# Patient Record
Sex: FEMALE | Race: WHITE | URBAN - NONMETROPOLITAN AREA
[De-identification: names, ages, dates, MRNs, and addresses within clinical notes are randomized per-mention and may not be internally consistent; named-entity substitution may affect disease eponyms.]

---

## 2022-03-22 ENCOUNTER — OFFICE VISIT (OUTPATIENT)
Dept: FAMILY MEDICINE CLINIC | Facility: CLINIC | Age: 52
End: 2022-03-22

## 2022-03-22 ENCOUNTER — TELEPHONE (OUTPATIENT)
Dept: PHYSICAL THERAPY | Facility: HOSPITAL | Age: 52
End: 2022-03-22

## 2022-03-22 VITALS
RESPIRATION RATE: 16 BRPM | DIASTOLIC BLOOD PRESSURE: 80 MMHG | SYSTOLIC BLOOD PRESSURE: 120 MMHG | TEMPERATURE: 97 F | BODY MASS INDEX: 33.62 KG/M2 | WEIGHT: 169 LBS | HEART RATE: 71 BPM | OXYGEN SATURATION: 98 % | HEIGHT: 59.5 IN

## 2022-03-22 DIAGNOSIS — M75.21 BICEPS TENDONITIS ON RIGHT: Primary | ICD-10-CM

## 2022-03-22 DIAGNOSIS — I48.11 LONGSTANDING PERSISTENT ATRIAL FIBRILLATION (HCC): ICD-10-CM

## 2022-03-22 DIAGNOSIS — Z79.01 LONG TERM (CURRENT) USE OF ANTICOAGULANTS: ICD-10-CM

## 2022-03-22 PROBLEM — I63.9 CVA (CEREBRAL VASCULAR ACCIDENT) (HCC): Status: ACTIVE | Noted: 2019-01-01

## 2022-03-22 PROCEDURE — 99203 OFFICE O/P NEW LOW 30 MIN: CPT | Performed by: FAMILY MEDICINE

## 2022-03-22 PROCEDURE — 3008F BODY MASS INDEX DOCD: CPT | Performed by: FAMILY MEDICINE

## 2022-03-22 PROCEDURE — 3079F DIAST BP 80-89 MM HG: CPT | Performed by: FAMILY MEDICINE

## 2022-03-22 PROCEDURE — 3074F SYST BP LT 130 MM HG: CPT | Performed by: FAMILY MEDICINE

## 2022-03-22 RX ORDER — APIXABAN 5 MG/1
5 TABLET, FILM COATED ORAL 2 TIMES DAILY
COMMUNITY
Start: 2022-02-23

## 2022-03-22 RX ORDER — FAMOTIDINE 40 MG/1
40 TABLET, FILM COATED ORAL 2 TIMES DAILY
COMMUNITY
Start: 2021-12-31

## 2022-03-22 NOTE — PATIENT INSTRUCTIONS
I discussed diagnosis and treatment plan  Would recommend physical therapy  Activity as tolerated  Patient may continue acetaminophen up to 1000 mg 3 times daily  She may benefit from the use of topical diclofenac 1% gel 4 times daily, may continue Biofreeze as needed  Follow-up after 2 weeks of therapy

## 2022-03-25 ENCOUNTER — TELEPHONE (OUTPATIENT)
Dept: PHYSICAL THERAPY | Facility: HOSPITAL | Age: 52
End: 2022-03-25

## 2022-03-28 ENCOUNTER — OFFICE VISIT (OUTPATIENT)
Dept: PHYSICAL THERAPY | Age: 52
End: 2022-03-28
Attending: FAMILY MEDICINE
Payer: OTHER MISCELLANEOUS

## 2022-03-28 DIAGNOSIS — M75.21 BICEPS TENDONITIS ON RIGHT: ICD-10-CM

## 2022-03-28 PROCEDURE — 97110 THERAPEUTIC EXERCISES: CPT

## 2022-03-28 PROCEDURE — 97161 PT EVAL LOW COMPLEX 20 MIN: CPT

## 2022-03-28 PROCEDURE — 97140 MANUAL THERAPY 1/> REGIONS: CPT

## 2022-03-31 ENCOUNTER — OFFICE VISIT (OUTPATIENT)
Dept: PHYSICAL THERAPY | Age: 52
End: 2022-03-31
Attending: FAMILY MEDICINE
Payer: OTHER MISCELLANEOUS

## 2022-03-31 DIAGNOSIS — M75.21 BICEPS TENDONITIS ON RIGHT: ICD-10-CM

## 2022-03-31 PROCEDURE — 97140 MANUAL THERAPY 1/> REGIONS: CPT

## 2022-03-31 PROCEDURE — 97110 THERAPEUTIC EXERCISES: CPT

## 2022-04-08 ENCOUNTER — OFFICE VISIT (OUTPATIENT)
Dept: PHYSICAL THERAPY | Age: 52
End: 2022-04-08
Attending: FAMILY MEDICINE
Payer: OTHER MISCELLANEOUS

## 2022-04-08 DIAGNOSIS — M75.21 BICEPS TENDONITIS ON RIGHT: ICD-10-CM

## 2022-04-08 PROCEDURE — 97140 MANUAL THERAPY 1/> REGIONS: CPT

## 2022-04-08 PROCEDURE — 97110 THERAPEUTIC EXERCISES: CPT

## 2022-04-11 ENCOUNTER — OFFICE VISIT (OUTPATIENT)
Dept: PHYSICAL THERAPY | Age: 52
End: 2022-04-11
Attending: FAMILY MEDICINE
Payer: OTHER MISCELLANEOUS

## 2022-04-11 DIAGNOSIS — M75.21 BICEPS TENDONITIS ON RIGHT: ICD-10-CM

## 2022-04-11 PROCEDURE — 97140 MANUAL THERAPY 1/> REGIONS: CPT

## 2022-04-11 PROCEDURE — 97110 THERAPEUTIC EXERCISES: CPT

## 2022-04-14 ENCOUNTER — APPOINTMENT (OUTPATIENT)
Dept: PHYSICAL THERAPY | Age: 52
End: 2022-04-14
Attending: FAMILY MEDICINE
Payer: OTHER MISCELLANEOUS

## 2022-04-26 ENCOUNTER — OFFICE VISIT (OUTPATIENT)
Dept: PHYSICAL THERAPY | Age: 52
End: 2022-04-26
Attending: FAMILY MEDICINE
Payer: OTHER MISCELLANEOUS

## 2022-04-26 DIAGNOSIS — M75.21 BICEPS TENDONITIS ON RIGHT: ICD-10-CM

## 2022-04-26 PROCEDURE — 97140 MANUAL THERAPY 1/> REGIONS: CPT

## 2022-04-26 PROCEDURE — 97110 THERAPEUTIC EXERCISES: CPT

## 2022-04-29 ENCOUNTER — OFFICE VISIT (OUTPATIENT)
Dept: PHYSICAL THERAPY | Age: 52
End: 2022-04-29
Attending: FAMILY MEDICINE
Payer: OTHER MISCELLANEOUS

## 2022-04-29 DIAGNOSIS — M75.21 BICEPS TENDONITIS ON RIGHT: ICD-10-CM

## 2022-04-29 PROCEDURE — 97140 MANUAL THERAPY 1/> REGIONS: CPT

## 2022-04-29 PROCEDURE — 97110 THERAPEUTIC EXERCISES: CPT

## 2022-05-03 ENCOUNTER — OFFICE VISIT (OUTPATIENT)
Dept: PHYSICAL THERAPY | Age: 52
End: 2022-05-03
Attending: FAMILY MEDICINE
Payer: OTHER MISCELLANEOUS

## 2022-05-03 DIAGNOSIS — M75.21 BICEPS TENDONITIS ON RIGHT: ICD-10-CM

## 2022-05-03 PROCEDURE — 97110 THERAPEUTIC EXERCISES: CPT

## 2022-05-03 PROCEDURE — 97140 MANUAL THERAPY 1/> REGIONS: CPT

## 2022-05-10 ENCOUNTER — APPOINTMENT (OUTPATIENT)
Dept: PHYSICAL THERAPY | Age: 52
End: 2022-05-10
Attending: FAMILY MEDICINE
Payer: OTHER MISCELLANEOUS

## 2022-05-10 ENCOUNTER — TELEPHONE (OUTPATIENT)
Dept: PHYSICAL THERAPY | Facility: HOSPITAL | Age: 52
End: 2022-05-10

## 2022-05-12 ENCOUNTER — OFFICE VISIT (OUTPATIENT)
Dept: FAMILY MEDICINE CLINIC | Facility: CLINIC | Age: 52
End: 2022-05-12
Payer: OTHER MISCELLANEOUS

## 2022-05-12 VITALS
BODY MASS INDEX: 34.32 KG/M2 | HEIGHT: 59 IN | HEART RATE: 70 BPM | SYSTOLIC BLOOD PRESSURE: 126 MMHG | TEMPERATURE: 97 F | DIASTOLIC BLOOD PRESSURE: 78 MMHG | OXYGEN SATURATION: 97 % | RESPIRATION RATE: 20 BRPM | WEIGHT: 170.25 LBS

## 2022-05-12 DIAGNOSIS — M65.841 STENOSING TENOSYNOVITIS OF FINGER OF RIGHT HAND: Primary | ICD-10-CM

## 2022-05-12 DIAGNOSIS — M75.21 BICEPS TENDONITIS ON RIGHT: ICD-10-CM

## 2022-05-12 PROCEDURE — 99213 OFFICE O/P EST LOW 20 MIN: CPT | Performed by: FAMILY MEDICINE

## 2022-05-12 PROCEDURE — 3074F SYST BP LT 130 MM HG: CPT | Performed by: FAMILY MEDICINE

## 2022-05-12 PROCEDURE — 3078F DIAST BP <80 MM HG: CPT | Performed by: FAMILY MEDICINE

## 2022-05-12 PROCEDURE — 3008F BODY MASS INDEX DOCD: CPT | Performed by: FAMILY MEDICINE

## 2022-05-12 RX ORDER — CYANOCOBALAMIN (VITAMIN B-12) 1000 MCG
1 TABLET, EXTENDED RELEASE ORAL DAILY
COMMUNITY

## 2022-05-12 RX ORDER — MOMETASONE FUROATE 50 UG/1
SPRAY, METERED NASAL
COMMUNITY

## 2022-05-12 NOTE — PATIENT INSTRUCTIONS
I discussed likely diagnosis for right fourth finger dysfunction. We recommend hand specialist opinion. In the interim, would recommend moist heat followed by vigorous deep tissue massage and stretching.   No further therapy needed for the biceps tendinitis

## 2022-06-02 ENCOUNTER — OFFICE VISIT (OUTPATIENT)
Dept: PHYSICAL THERAPY | Age: 52
End: 2022-06-02
Attending: FAMILY MEDICINE
Payer: OTHER MISCELLANEOUS

## 2022-06-02 PROCEDURE — 97110 THERAPEUTIC EXERCISES: CPT

## 2022-06-02 PROCEDURE — 97140 MANUAL THERAPY 1/> REGIONS: CPT

## 2025-06-17 NOTE — PROCEDURES
The office order for PCP removal request is Approved and finalized on June 17, 2025.    Removed Jan Haney DO as the patient's Primary Care Physician

## 2025-07-17 NOTE — PROGRESS NOTES
Screening questions completed by Vane MEAD  Do you have any vision loss that cannot be corrected (with eye glasses), blurred vision or floaters? No  Are you currently pregnant?  No   If questionable we can preform in office urine testing per your request  Do you have a diagnosis of macular edema, severe non-proliferative retinopathy, proliferative retinopathy, radiation retinopathy, or retinal vein occlusion? No  Have you had laser treatment of the retina or injections into either eye, or any history of retinal surgery?  No    This test may be contraindicated for fundus imaging if you:  Are hypersensitive to light  Taking medications that cause photosensitivity  Recently underwent photodynamic therapy (PDT)   *this is a type of phototherapy used to  elicit cell death for treatment of disease, including age-related macular degeneration.    Please call [facility] [phone] for scheduling.

## 2025-07-17 NOTE — PROGRESS NOTES
DM eye exam completed   No diabetic retinopathy detected  Patient verbalized understanding  Printed report and given to patient  Patient left office in stable condition

## 2025-07-17 NOTE — PROGRESS NOTES
The following individual(s) verbally consented to be recorded using ambient AI listening technology and understand that they can each withdraw their consent to this listening technology at any point by asking the clinician to turn off or pause the recording:    Patient name: Ariadna Olivares  Additional names:  none        Chief Complaint   Patient presents with    Well Adult    Physical     Pt is fasting     Other       History of Present Illness  Ariadna Olivares is a 55 year old female with diabetes and hypertrophic cardiomyopathy who presents for cardiology referrals and management of her diabetes.    She is currently on Mounjaro for weight loss and diabetes management, with recent labs showing improvement. Her HbA1c is 6.2%, and her LDL cholesterol is elevated at 151 mg/dL. She is not on cholesterol medication as her primary care doctor and cardiologist are monitoring her numbers with Mounjaro before adding more medication.    She has an ICD due to an abnormal EKG and NSVT detected about six years ago and is on Eliquis as a blood thinner. She regularly sees a cardiologist and is seeking referrals for cardiology, urology, gastroenterology, and electrophysiology. She has a history of stroke, which led to the ICD placement, and hypertrophic cardiomyopathy.    She has hematuria, which has progressed from very little to moderate amounts, prompting her to see a urologist. She also has diverticulosis and is seeking care from a gastroenterologist.    She was admitted to the hospital on August 21, 2024, for a migraine and hematuria, resulting in a six-day stay. During this time, she developed pneumonia, possibly due to fluid overload, although she did not have a fever. She was informed she was a 'very sick lady' but did not receive a clear explanation for her prolonged hospital stay.    She has a family history of polypharmacy concerns, as both her parents were on numerous medications before they passed away. She  is cautious about taking more medications than necessary.    She has three grown children and nine grandchildren and participates in a fifty-mile challenge through work, involving walking, running, and exercising over thirty days.    No sleep apnea, no snoring, and she has not had a diabetic eye exam       Past Medical History[1]    Past Surgical History[2]    Social Hx on file[3]    Family History[4]     Medications Ordered Prior to Encounter[5]      Objective  Vitals:    07/17/25 0743   BP: 118/78   Pulse: 70   Resp: 18   Temp: 97.6 °F (36.4 °C)   TempSrc: Temporal   SpO2: 98%   Weight: 151 lb 12.8 oz (68.9 kg)   Height: 4' 11\" (1.499 m)         Body mass index is 30.66 kg/m².    Physical Exam  Constitutional:       Appearance: Normal appearance.   HEENT:      Head: Normocephalic and atraumatic.      Eyes: PERRLA no notable nystagmus     Ears: normal on observation     Nose: Nose normal.      Mouth: Mucous membranes are moist.      Neck: no masses no bruit  Cardiovascular:      Rate and Rhythm: Normal rate and regular rhythm.   Pulmonary:      Effort: Pulmonary effort is normal.      Breath sounds: Normal breath sounds.   Abdominal:      General: Bowel sounds are normal.      Palpations: Abdomen is soft. There is no mass.   Musculoskeletal:         General: Normal range of motion.      Cervical back: Normal range of motion.   Skin:     General: Skin is warm and dry.   Neurological:      General: No focal deficit present.      Mental Status: She is alert and oriented to person, place, and time.   Psychiatric:         Mood and Affect: Mood normal.         Thought Content: Thought content normal.   Bilateral barefoot skin diabetic exam is normal, visualized feet and the appearance is normal.  Bilateral monofilament/sensation of both feet is normal.  Pulsation pedal pulse exam of both lower legs/feet is normal as well.       Assessment and Plan  Assessment & Plan  Diabetes Mellitus  Persistent hyperglycemia with A1c  of 6.2. Statin therapy recommended for cardiovascular and cognitive protection. Addressed polypharmacy concerns.  - Discuss statin therapy for cardiovascular and cognitive benefits.  - Order complete metabolic panel and urine microalbumin.    Hyperlipidemia  LDL of 151, above target for diabetics. Statin therapy recommended to lower cholesterol and reduce cardiovascular risk. Consider heart scan based on family history of polypharmacy.  - Discuss benefits and risks of statin therapy, including cognitive protection.  - Consider heart scan for coronary artery disease assessment.    Hypertrophic Cardiomyopathy  ICD in place, on metoprolol. Referral to interventional cardiologist for further management.  - Refer to Dr. Kirk, interventional cardiologist.  - Consider electrophysiologist referral post-cardiology evaluation.    History of Stroke  Concern about lack of statin therapy for secondary prevention. Statin therapy recommended for cerebrovascular and cardiovascular protection.  - Discuss importance of statin therapy for secondary prevention.    Hematuria  Under urologist evaluation.  - Ensure follow-up with urologist.    Diverticulosis  No active diverticulitis, under gastroenterologist care.  - Ensure follow-up with gastroenterologist.    General Health Maintenance  Advised on healthy lifestyle for diabetes and overall health management. Declined vaccinations, advised reconsideration.  - Encourage 130 minutes of exercise per week.  - Advise Mediterranean-type diet with reduced carbohydrates.  - Ensure regular ophthalmologic and dental exams.  - Discuss shingles vaccine and encourage reconsideration.    Follow-up  Requires follow-up with specialists for comprehensive management.  - Schedule follow-up with Dr. Kirk within 1-2 weeks.  - Ensure follow-up with urologist and gastroenterologist.  - Perform blood work for renal and hepatic function assessment.      ICD-10-CM    1. Well adult exam  Z00.00 3D Mammogram  Digital Screen, Bilateral (CPT=77067/21711)     CBC With Differential With Platelet     Comp Metabolic Panel (14)     TSH and Free T4     CBC With Differential With Platelet     Comp Metabolic Panel (14)     TSH and Free T4      2. Visit for screening mammogram  Z12.31 3D Mammogram Digital Screen, Bilateral (CPT=77067/64773)      3. Screening, ischemic heart disease  Z13.6 Cardio Referral - Internal     CT CALCIUM SCORING      4. Hematuria, unspecified type  R31.9 Urology Referral - In Network      5. Diverticulosis  K57.90 Gastro Referral - In Network      6. NSVT (nonsustained ventricular tachycardia) (HCC)  I47.29 Will disucss with cardiology has ICD in place              Follow up  No follow-ups on file.      Patient Instructions  There are no Patient Instructions on file for this visit.       Lenore Junior MD       This note was created by Dragon voice recognition and or [employer] transcription service. Errors in content may be related to improper recognition by the system; efforts to review and correct have been done but errors may still exist. Please be advised the primary purpose of this note is for me to communicate medical care. Standard sentence structure is not always used. Medical terminology and medical abbreviations may be used. There may be grammatical, typographical, and automated fill ins that may have errors missed in proofreading.          [1]   Past Medical History:   Atrial fibrillation (HCC)    CVA (cerebral vascular accident) (HCC)   [2]   Past Surgical History:  Procedure Laterality Date    Appendectomy      Cholecystectomy      Tubal ligation     [3]   Social History  Socioeconomic History    Marital status:    Tobacco Use    Smoking status: Never     Passive exposure: Never    Smokeless tobacco: Never   Vaping Use    Vaping status: Never Used   Substance and Sexual Activity    Alcohol use: Never    Drug use: Never   [4] History reviewed. No pertinent family history.  [5]    Current Outpatient Medications on File Prior to Visit   Medication Sig Dispense Refill    albuterol 108 (90 Base) MCG/ACT Inhalation Aero Soln Inhale 1 puff into the lungs every 6 (six) hours as needed for Wheezing.      acetaminophen-codeine 300-30 MG Oral Tab Take 1 tablet by mouth every 4 (four) hours as needed for Pain.      Propylene Glycol 0.6 % Ophthalmic Solution Apply 1 drop to eye nightly.      mometasone furoate 50 MCG/ACT Nasal Suspension       Coenzyme Q10 30 MG Oral Cap Take by mouth As Directed.      Probiotic Product (PROBIOTIC DAILY OR) Take by mouth.      famotidine 40 MG Oral Tab Take 40 mg by mouth 2 (two) times daily.      metoprolol tartrate 25 MG Oral Tab Take 12.5 mg by mouth 2 (two) times daily.      ELIQUIS 5 MG Oral Tab Take 5 mg by mouth 2 (two) times daily.      MOUNJARO 10 MG/0.5ML Subcutaneous Solution Auto-injector ADMINISTER 10 MG UNDER THE SKIN WEEKLY      Calcium Citrate-Vitamin D 315-250 MG-UNIT Oral Tab Take 1 tablet by mouth daily. (Patient not taking: Reported on 7/17/2025)       No current facility-administered medications on file prior to visit.

## 2025-07-22 NOTE — TELEPHONE ENCOUNTER
Patient had microalbumin/creat completed on 02/17/25  Correct external results entered  [facility] gaps updated      (NEED TO REMOVE EXTERNAL RESULTS ENTERED ON 02/17/25 FOR \"microalbumin, random urine\")    Please advise, thank you

## 2025-07-22 NOTE — TELEPHONE ENCOUNTER
MOUNJARO 10 MG/0.5ML Subcutaneous Solution Auto-injector   Pt failed refill protocol for the following reasons:    Diabetes Medication Protocol [id number]/22/2025 04:03 AM   Protocol Details Medication is active on med list    Last A1C < 7.5 and within past 6 months    In person appointment or virtual visit in the past 6 mos or appointment in next 3 mos    Microalbumin procedure in past 12 months or taking ACE/ARB    EGFRCR or GFRNAA > 50    GFR in the past 12 months      Last refill: N/A  Last appt: 7/17/2025  Last lab: A1C, 6/30/2025  Next appt:   Future Appointments   Date Time Provider Department Center   8/4/2025  2:15 PM [facility]     Forward to Dr. Monique Junior, please advise on refills. Thank you.